# Patient Record
Sex: FEMALE | Race: OTHER
[De-identification: names, ages, dates, MRNs, and addresses within clinical notes are randomized per-mention and may not be internally consistent; named-entity substitution may affect disease eponyms.]

---

## 2019-01-27 ENCOUNTER — HOSPITAL ENCOUNTER (EMERGENCY)
Dept: HOSPITAL 62 - ER | Age: 19
LOS: 1 days | Discharge: HOME | End: 2019-01-28
Payer: OTHER GOVERNMENT

## 2019-01-27 VITALS — DIASTOLIC BLOOD PRESSURE: 69 MMHG | SYSTOLIC BLOOD PRESSURE: 110 MMHG
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
## 2019-01-27 DIAGNOSIS — S11.91XA: Primary | ICD-10-CM

## 2019-01-27 DIAGNOSIS — J45.909: ICD-10-CM

## 2019-01-27 PROCEDURE — 12002 RPR S/N/AX/GEN/TRNK2.6-7.5CM: CPT

## 2019-01-27 PROCEDURE — 90715 TDAP VACCINE 7 YRS/> IM: CPT

## 2019-01-27 PROCEDURE — 0HQ4XZZ REPAIR NECK SKIN, EXTERNAL APPROACH: ICD-10-PCS

## 2019-01-27 PROCEDURE — 90471 IMMUNIZATION ADMIN: CPT

## 2019-01-27 PROCEDURE — 99282 EMERGENCY DEPT VISIT SF MDM: CPT

## 2019-01-27 NOTE — ER DOCUMENT REPORT
HPI





- HPI


Time Seen by Provider: 01/27/19 21:43


Pain Level: 4


Notes: 





Patient is a 18-year-old female who presents to the emergency department 

complaining of laceration to her neck.  She states that she was walking through 

her house with a razor-like eyebrow tool in her hand when it fell striking her 

neck and causing the laceration.  She reports this happened just prior to 

arrival.  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 

up-to-date including tetanus.








- CONSTITUTIONAL


Constitutional: DENIES: Fever, Chills





- REPRODUCTIVE


Reproductive: DENIES: Pregnant:





Past Medical History





- General


Information source: Patient





- Social History


Smoking Status: Never Smoker


Frequency of alcohol use: None


Drug Abuse: None


Family History: Reviewed & Not Pertinent


Patient has suicidal ideation: No


Patient has homicidal ideation: No





- Medical History


Medical History: Negative


Pulmonary Medical History: Reports: Hx Asthma


Renal/ Medical History: Denies: Hx Peritoneal Dialysis


Surgical Hx: Negative





- Immunizations


Immunizations up to date: Yes


Hx Diphtheria, Pertussis, Tetanus Vaccination: Yes





Vertical Provider Document





- CONSTITUTIONAL


Notes: 





PHYSICAL EXAMINATION:





GENERAL: Well-appearing, well-nourished and in no acute distress.





HEAD: Atraumatic, normocephalic.





EYES: Pupils equal round extraocular movements intact,  conjunctiva are normal.





ENT: Nares patent





NECK: Normal range of motion





LUNGS: No respiratory distress





Musculoskeletal: Normal range of motion





NEUROLOGICAL:  Normal speech, normal gait. 





PSYCH: Normal mood, normal affect.





SKIN: Warm, Dry, normal turgor, no rashes or lesions noted.  3 cm superficial 

laceration noted to anterior right side of neck, no active bleeding noted, 

approximates well.





- INFECTION CONTROL


TRAVEL OUTSIDE OF THE [place] IN LAST 30 DAYS: No





Course





- Re-evaluation


Re-evalutation: 





Laceration was repaired under sterile technique, see procedure note.  Patient 

tolerated well.  Patient instructed on proper care of laceration and instructed 

on suture removal time.








- Vital Signs


Vital signs: 


                                        











Temp Pulse Resp BP Pulse Ox


 


 98.6 F   65   16   110/69   100 


 


 01/27/19 20:17  01/27/19 20:17  01/27/19 20:17  01/27/19 20:17  01/27/19 20:17














Procedures





- Laceration/Wound Repair


  ** Neck


Wound length (cm): 3


Wound's Depth, Shape: Superficial


Laceration pre-procedure: Sterile PPE donned


Anesthetic type: 1% Lidocaine


Wound explored: Clean


Wound Debrided: Minimal


Wound Repaired With: Sutures


Suture Size/Type: 6:0, Nylon


Number of Sutures: 7


Post-procedure wound care: Sterile dressing applied


Post-procedure NV exam normal: Yes


Complications: No





Discharge





- Discharge


Clinical Impression: 


 Laceration





Condition: Stable


Disposition: HOME, SELF-CARE


Additional Instructions: 


Laceration Care





     Your laceration has been sutured to keep the skin edges aligned during 

healing.  The time of suture removal depends on the nature and location of your 

cut.  Please follow the care instructions the doctor has outlined for you and 

return for further care, according to the schedule you've been given.


     Keep the wound and dressing clean.  Unless you were told otherwise, you may

shower daily, blotting the wound dry with a clean, unused towel.  At other 

times, If the dressing gets wet or blood soaked, remove it and blot the wound 

dry, then reapply a new dressing.  Unless you were instructed otherwise, 

dressings should be changed at least daily.


     If any signs of infection occur (swelling, redness, increasing tenderness, 

red streaks, tender lumps in the armpit or groin above the laceration, or 

fever), see the doctor immediately.





*****Please return to the emergency department or your primary care provider in 

6-8 days for suture removal.  Please return earlier if you develop any signs of 

infection such as increased redness, swelling, foul-smelling drainage or 

fever.*****